# Patient Record
Sex: MALE | RURAL
[De-identification: names, ages, dates, MRNs, and addresses within clinical notes are randomized per-mention and may not be internally consistent; named-entity substitution may affect disease eponyms.]

---

## 2020-09-16 ENCOUNTER — OFFICE VISIT (OUTPATIENT)
Dept: PRIMARY CARE CLINIC | Age: 25
End: 2020-09-16

## 2020-09-16 DIAGNOSIS — Z20.828 EXPOSURE TO SARS-ASSOCIATED CORONAVIRUS: Primary | ICD-10-CM

## 2020-09-18 LAB — SARS-COV-2, NAA: NOT DETECTED

## 2024-03-06 ENCOUNTER — HOSPITAL ENCOUNTER (EMERGENCY)
Facility: HOSPITAL | Age: 29
Discharge: HOME | End: 2024-03-06
Attending: EMERGENCY MEDICINE
Payer: MEDICAID

## 2024-03-06 VITALS
HEART RATE: 97 BPM | OXYGEN SATURATION: 98 % | HEIGHT: 75 IN | BODY MASS INDEX: 39.17 KG/M2 | DIASTOLIC BLOOD PRESSURE: 102 MMHG | WEIGHT: 315 LBS | TEMPERATURE: 99.3 F | RESPIRATION RATE: 18 BRPM | SYSTOLIC BLOOD PRESSURE: 156 MMHG

## 2024-03-06 DIAGNOSIS — J02.0 STREP PHARYNGITIS: Primary | ICD-10-CM

## 2024-03-06 LAB
DEPRECATED S PYO AG THROAT QL EIA: POSITIVE
FLUAV RNA SPEC QL NAA+PROBE: NOT DETECTED
FLUBV RNA SPEC QL NAA+PROBE: NOT DETECTED
SARS-COV-2 RNA RESP QL NAA+PROBE: NOT DETECTED

## 2024-03-06 PROCEDURE — 87880 STREP A ASSAY W/OPTIC: CPT

## 2024-03-06 PROCEDURE — 6360000002 HC RX W HCPCS: Performed by: EMERGENCY MEDICINE

## 2024-03-06 PROCEDURE — 87636 SARSCOV2 & INF A&B AMP PRB: CPT

## 2024-03-06 PROCEDURE — 99283 EMERGENCY DEPT VISIT LOW MDM: CPT

## 2024-03-06 RX ORDER — DEXAMETHASONE 4 MG/1
10 TABLET ORAL ONCE
Status: COMPLETED | OUTPATIENT
Start: 2024-03-06 | End: 2024-03-06

## 2024-03-06 RX ORDER — AMOXICILLIN 500 MG/1
500 CAPSULE ORAL 2 TIMES DAILY
Qty: 20 CAPSULE | Refills: 0 | Status: SHIPPED | OUTPATIENT
Start: 2024-03-06 | End: 2024-03-16

## 2024-03-06 RX ADMIN — DEXAMETHASONE 10 MG: 4 TABLET ORAL at 07:58

## 2024-03-06 ASSESSMENT — PAIN SCALES - GENERAL: PAINLEVEL_OUTOF10: 7

## 2024-03-06 ASSESSMENT — LIFESTYLE VARIABLES: HOW OFTEN DO YOU HAVE A DRINK CONTAINING ALCOHOL: MONTHLY OR LESS

## 2024-03-06 ASSESSMENT — PAIN - FUNCTIONAL ASSESSMENT: PAIN_FUNCTIONAL_ASSESSMENT: 0-10

## 2024-03-06 ASSESSMENT — PAIN DESCRIPTION - LOCATION: LOCATION: THROAT

## 2024-03-06 NOTE — ED PROVIDER NOTES
[facility] EMERGENCY DEP  EMERGENCY DEPARTMENT ENCOUNTER       Pt Name: Raine Giron  MRN: [id number]  Birthdate 1995  Date of evaluation: 3/6/2024  Provider: Muna Palencia MD   PCP: Eliazar Zuñiga APRN - NP  Note Started: 7:55 AM 3/6/24     CHIEF COMPLAINT       Chief Complaint   Patient presents with    Sore Throat        HISTORY OF PRESENT ILLNESS: 1 or more elements      History From: Patient  Limitations: None     Raine Giron is a 28 y.o. male who presents to the emergency department for sore throat x 1.5 weeks.  Reports severe, scratchy throat.  Hurts when he swallows.  Has not noted a fever or cough.  Unsure of sick contacts.  No treatments for symptoms prior to arrival.     Nursing Notes were all reviewed and agreed with or any disagreements were addressed in the HPI.       PHYSICAL EXAM      ED Triage Vitals   Enc Vitals Group      BP 03/06/24 0729 (!) 156/102      Pulse 03/06/24 0717 97      Respirations 03/06/24 0717 18      Temp 03/06/24 0717 99.3 °F (37.4 °C)      Temp Source 03/06/24 0717 Oral      SpO2 03/06/24 0717 98 %      Weight - Scale 03/06/24 0717 (!) 142.9 kg (315 lb)      Height 03/06/24 0717 1.905 m (6' 3\")      Head Circumference --       Peak Flow --       Pain Score --       Pain Loc --       Pain Edu? --       Excl. in GC? --               Physical Exam  Constitutional:       Appearance: Normal appearance.   HENT:      Mouth/Throat:      Mouth: Mucous membranes are moist.      Pharynx: Posterior oropharyngeal erythema present. No oropharyngeal exudate.   Cardiovascular:      Rate and Rhythm: Normal rate and regular rhythm.   Pulmonary:      Effort: Pulmonary effort is normal.      Breath sounds: Normal breath sounds.   Skin:     General: Skin is warm.   Neurological:      Mental Status: He is alert and oriented to person, place, and time.            DIAGNOSTIC RESULTS   LABS:     Recent Results (from the past 24 hour(s))   Rapid Strep Screen    Collection Time: 03/06/24

## 2024-10-26 ENCOUNTER — HOSPITAL ENCOUNTER (EMERGENCY)
Facility: HOSPITAL | Age: 29
Discharge: HOME | End: 2024-10-26
Attending: EMERGENCY MEDICINE
Payer: MEDICAID

## 2024-10-26 VITALS
HEIGHT: 75 IN | HEART RATE: 86 BPM | WEIGHT: 300 LBS | DIASTOLIC BLOOD PRESSURE: 110 MMHG | BODY MASS INDEX: 37.3 KG/M2 | OXYGEN SATURATION: 99 % | SYSTOLIC BLOOD PRESSURE: 161 MMHG | TEMPERATURE: 98.7 F | RESPIRATION RATE: 16 BRPM

## 2024-10-26 DIAGNOSIS — H10.89 OTHER CONJUNCTIVITIS OF RIGHT EYE: ICD-10-CM

## 2024-10-26 DIAGNOSIS — S05.8X1A: Primary | ICD-10-CM

## 2024-10-26 PROCEDURE — 99283 EMERGENCY DEPT VISIT LOW MDM: CPT

## 2024-10-26 RX ORDER — OFLOXACIN 3 MG/ML
2 SOLUTION/ DROPS OPHTHALMIC 4 TIMES DAILY
Qty: 5 ML | Refills: 0 | Status: SHIPPED | OUTPATIENT
Start: 2024-10-26 | End: 2024-11-05

## 2024-10-26 ASSESSMENT — LIFESTYLE VARIABLES: HOW OFTEN DO YOU HAVE A DRINK CONTAINING ALCOHOL: MONTHLY OR LESS

## 2024-10-26 NOTE — ED PROVIDER NOTES
Peak Flow       Pain Score       Pain Loc       Pain Education       Exclude from Growth Chart         Physical Exam  Constitutional:       Appearance: Normal appearance.   HENT:      Nose: Nose normal.      Mouth/Throat:      Mouth: Mucous membranes are moist.   Eyes:      General: Lids are normal.         Right eye: No foreign body or discharge.         Left eye: No foreign body or discharge.      Extraocular Movements: Extraocular movements intact.      Conjunctiva/sclera:      Right eye: Right conjunctiva is injected. No exudate or hemorrhage.     Left eye: Left conjunctiva is not injected.      Comments: No corneal abrasion or ulcer noted on eye staining.  Right eye photophobia.  No consensual photophobia.  Pupils equal and reactive to light.   Neurological:      Mental Status: He is alert.          DIAGNOSTIC RESULTS   LABS:     No results found for this or any previous visit (from the past 24 hour(s)).    EKG: If performed, independent interpretation documented below in the MDM section     RADIOLOGY:  Non-plain film images such as CT, Ultrasound and MRI are read by the radiologist. Plain radiographic images are visualized and preliminarily interpreted by the ED Provider with the findings documented in the MDM section.     Interpretation per the Radiologist below, if available at the time of this note:     No orders to display        PROCEDURES   Unless otherwise noted below, none  Procedures     EMERGENCY DEPARTMENT COURSE and DIFFERENTIAL DIAGNOSIS/MDM   Vitals:    Vitals:    10/26/24 1638   BP: (!) 161/110   Pulse: 86   Resp: 16   Temp: 98.7 °F (37.1 °C)   SpO2: 99%   Weight: 136.1 kg (300 lb)   Height: 1.905 m (6' 3\")        Patient was given the following medications:  Medications - No data to display    Medical Decision Making  Risk  Prescription drug management.      28-year-old male with right eye redness.  Patient reports he accidentally got a fingernail in his eye 2 days ago.  Reports he now has  photophobia with sunlight, pain, and redness.  No crusting.  Not a contact lens wearer.  No treatments for symptoms prior to arrival.    On corneal staining, no corneal abrasion noted.  Increased uptake around conjunctiva.  Extraocular movements are intact.  Pupils are equal and reactive to light -low suspicion for acute angle glaucoma based on normal pupil.  Patient has photophobia and right eye but no consensual photophobia to suggest uveitis.  No foreign body noted.    Will recommend go through a course of ophthalmic antibiotics for possible conjunctivitis.  Will also recommend follow-up with ophthalmology.  Plan for discharge home.    FINAL IMPRESSION     1. Superficial trauma of eye, right, initial encounter    2. Other conjunctivitis of right eye          DISPOSITION/PLAN   Raine Giron's  results have been reviewed with him.  He has been counseled regarding his diagnosis, treatment, and plan.  He verbally conveys understanding and agreement of the signs, symptoms, diagnosis, treatment and prognosis and additionally agrees to follow up as discussed.  He also agrees with the care-plan and conveys that all of his questions have been answered.  I have also provided discharge instructions for him that include: educational information regarding their diagnosis and treatment, and list of reasons why they would want to return to the ED prior to their follow-up appointment, should his condition change.     CLINICAL IMPRESSION    Discharge Note: The patient is stable for discharge home. The signs, symptoms, diagnosis, and discharge instructions have been discussed, understanding conveyed, and agreed upon. The patient is to follow up as recommended or return to ER should their symptoms worsen.      PATIENT REFERRED TO:  Weiler, Harold H, MD  [address] [place] [address]   [facility] [address] [phone]    Schedule an appointment as soon as possible for a visit          DISCHARGE MEDICATIONS:     Medication List

## 2025-05-16 ENCOUNTER — APPOINTMENT (OUTPATIENT)
Facility: HOSPITAL | Age: 30
End: 2025-05-16
Payer: MEDICAID

## 2025-05-16 ENCOUNTER — HOSPITAL ENCOUNTER (EMERGENCY)
Facility: HOSPITAL | Age: 30
Discharge: HOME | End: 2025-05-16
Attending: EMERGENCY MEDICINE
Payer: MEDICAID

## 2025-05-16 VITALS
SYSTOLIC BLOOD PRESSURE: 162 MMHG | WEIGHT: 315 LBS | TEMPERATURE: 98 F | HEART RATE: 86 BPM | DIASTOLIC BLOOD PRESSURE: 116 MMHG | HEIGHT: 75 IN | RESPIRATION RATE: 17 BRPM | BODY MASS INDEX: 39.17 KG/M2 | OXYGEN SATURATION: 97 %

## 2025-05-16 DIAGNOSIS — I10 ESSENTIAL HYPERTENSION: ICD-10-CM

## 2025-05-16 DIAGNOSIS — M10.072 ACUTE IDIOPATHIC GOUT INVOLVING TOE OF LEFT FOOT: Primary | ICD-10-CM

## 2025-05-16 PROCEDURE — 99283 EMERGENCY DEPT VISIT LOW MDM: CPT

## 2025-05-16 PROCEDURE — 73630 X-RAY EXAM OF FOOT: CPT

## 2025-05-16 RX ORDER — INDOMETHACIN 50 MG/1
50 CAPSULE ORAL 2 TIMES DAILY PRN
Qty: 30 CAPSULE | Refills: 0 | Status: SHIPPED | OUTPATIENT
Start: 2025-05-16

## 2025-05-16 RX ORDER — PREDNISONE 20 MG/1
TABLET ORAL
Qty: 20 TABLET | Refills: 0 | Status: SHIPPED | OUTPATIENT
Start: 2025-05-16

## 2025-05-16 RX ORDER — AMLODIPINE BESYLATE 5 MG/1
5 TABLET ORAL DAILY
Qty: 30 TABLET | Refills: 0 | Status: SHIPPED | OUTPATIENT
Start: 2025-05-16

## 2025-05-16 ASSESSMENT — PAIN SCALES - GENERAL: PAINLEVEL_OUTOF10: 10

## 2025-05-16 ASSESSMENT — LIFESTYLE VARIABLES
HOW MANY STANDARD DRINKS CONTAINING ALCOHOL DO YOU HAVE ON A TYPICAL DAY: 1 OR 2
HOW OFTEN DO YOU HAVE A DRINK CONTAINING ALCOHOL: 2-4 TIMES A MONTH

## 2025-05-16 ASSESSMENT — PAIN - FUNCTIONAL ASSESSMENT: PAIN_FUNCTIONAL_ASSESSMENT: 0-10

## 2025-05-16 ASSESSMENT — PAIN DESCRIPTION - ORIENTATION: ORIENTATION: LEFT

## 2025-05-16 ASSESSMENT — PAIN DESCRIPTION - LOCATION: LOCATION: FOOT

## 2025-05-16 NOTE — DISCHARGE INSTRUCTIONS
Take prednisone tablets as prescribed for 10 days.  Start with 60 mg for 4 days, then 40 mg for 4 days, then 20 mg for 2 days.    Take the indomethacin twice a day as needed for pain.    Start taking the amlodipine 5 mg daily for your blood pressure.    Make an appointment with your primary care doctor for a further evaluation of your blood pressure.    Come back to the emergency department immediately for any new or worsening symptoms.

## 2025-05-16 NOTE — ED NOTES
Discharge instructions provided and reviewed with pt. Opportunity provided for discussion and pt has no further questions at this time.

## 2025-05-16 NOTE — ED PROVIDER NOTES
[facility] EMERGENCY DEPARTMENT  EMERGENCY DEPARTMENT ENCOUNTER       Pt Name: Raine Giron  MRN: [id number]  Birthdate 1995  Date of evaluation: 5/16/2025  Provider: Muna Palencia MD   PCP: Eliazar Zuñiga APRN - NP  Note Started: 9:08 AM EDT 5/16/25     CHIEF COMPLAINT       Chief Complaint   Patient presents with    Foot Swelling        HISTORY OF PRESENT ILLNESS: 1 or more elements      History From: patient, History limited by: none     Raine Giron is a 29 y.o. male presents to ED complaining of L foot pain.  Onset 2 days ago.  No trauma.       Please See MDM for Additional Details of the HPI/PMH  Nursing Notes were all reviewed and agreed with or any disagreements were addressed in the HPI.     REVIEW OF SYSTEMS        Positives and Pertinent negatives as per HPI.    PAST HISTORY     Past Medical History:  Past Medical History:   Diagnosis Date    Chicken pox 02/26/2001    Contusion of left foot 09/27/2005    Developmental delay 05/01/98    Laceration of scalp 03/09/2010    Myopia 05/01/98    Otitis media        Past Surgical History:  Past Surgical History:   Procedure Laterality Date    CIRCUMCISION         Family History:  History reviewed. No pertinent family history.    Social History:  Social History     Tobacco Use    Smoking status: Every Day     Current packs/day: 0.25     Types: Cigarettes    Smokeless tobacco: Never   Substance Use Topics    Alcohol use: Yes    Drug use: No       CURRENT MEDICATIONS      Discharge Medication List as of 5/16/2025  9:38 AM          SCREENINGS               No data recorded            PHYSICAL EXAM      ED Triage Vitals   Encounter Vitals Group      BP       Systolic BP Percentile       Diastolic BP Percentile       Pulse       Resp       Temp       Temp src       SpO2       Weight       Height       Head Circumference       Peak Flow       Pain Score       Pain Loc       Pain Education       Exclude from Growth Chart         Physical  of gout.    On exam, patient has a swollen left MTP joint that is tender to palpation.  He has a normal DP pulse.  He has no ankle tenderness.  He is able to bear and exchange weight.    High suspicion for gout.  X-ray obtained that reveals no acute fracture or degenerative arthritis at location of pain.  Will recommend course of steroids plus indomethacin for pain.    Patient is also notably hypertensive.  Reports he has been told this in the past but has not been started on any medication and does not see a primary care doctor.  Will initiate amlodipine and recommend PCP follow-up for continued BP monitoring and an annual checkup.  Will discharge home.      FINAL IMPRESSION     1. Acute idiopathic gout involving toe of left foot    2. Essential hypertension          DISPOSITION/PLAN   Raine Giron's  results have been reviewed with him.  He has been counseled regarding his diagnosis, treatment, and plan.  He verbally conveys understanding and agreement of the signs, symptoms, diagnosis, treatment and prognosis and additionally agrees to follow up as discussed.  He also agrees with the care-plan and conveys that all of his questions have been answered.  I have also provided discharge instructions for him that include: educational information regarding their diagnosis and treatment, and list of reasons why they would want to return to the ED prior to their follow-up appointment, should his condition change.     CLINICAL IMPRESSION    Discharge Note: The patient is stable for discharge home. The signs, symptoms, diagnosis, and discharge instructions have been discussed, understanding conveyed, and agreed upon. The patient is to follow up as recommended or return to ER should their symptoms worsen.      PATIENT REFERRED TO:  Eliazar Zuñiga APRN - NP  [address] [facility] [address] [phone]    Schedule an appointment as soon as possible for a visit          DISCHARGE MEDICATIONS:     Medication List

## 2025-06-17 NOTE — PROGRESS NOTES
Chief Complaint   Patient presents with    Employment Physical     [employer] physical         HPI:       is a 29 y.o. male.  PRAVIN Zuñiga is his PCP.  He has a history of gout only on PRN treatments of Indocin and Prednisone.  History of HTN.  Was prescribed Amlodipine previously, but is not taking it.     New Issues:  This patient presents today for an employment physical required by [employer].  Medications and problem list can be found below.     No Known Allergies    Current Outpatient Medications   Medication Sig Dispense Refill    amLODIPine (NORVASC) 5 MG tablet Take 1 tablet by mouth daily (Patient not taking: Reported on 6/19/2025) 30 tablet 0    predniSONE (DELTASONE) 20 MG tablet 60mg PO daily x 4 days, 40mg PO daily x 4 days, 20mg PO daily x 2 days.  Total 10 days, 22 tablets. (Patient not taking: Reported on 6/19/2025) 20 tablet 0    indomethacin (INDOCIN) 50 MG capsule Take 1 capsule by mouth 2 times daily as needed for Pain (Patient not taking: Reported on 6/19/2025) 30 capsule 0     No current facility-administered medications for this visit.       Past Medical History:   Diagnosis Date    Chicken pox 02/26/2001    Contusion of left foot 09/27/2005    Developmental delay 05/01/98    Laceration of scalp 03/09/2010    Myopia 05/01/98    Otitis media        Past Surgical History:   Procedure Laterality Date    CIRCUMCISION         Social History     Socioeconomic History    Marital status: Single     Spouse name: None    Number of children: None    Years of education: None    Highest education level: None   Tobacco Use    Smoking status: Every Day     Current packs/day: 0.25     Types: Cigarettes    Smokeless tobacco: Never   Substance and Sexual Activity    Alcohol use: Yes    Drug use: No     Social Drivers of Health     Food Insecurity: No Food Insecurity (6/19/2025)    Hunger Vital Sign     Worried About Running Out of Food in the Last Year: Never true     Ran Out of Food in the Last Year: Never true

## 2025-06-19 ENCOUNTER — OFFICE VISIT (OUTPATIENT)
Age: 30
End: 2025-06-19

## 2025-06-19 VITALS
RESPIRATION RATE: 18 BRPM | TEMPERATURE: 98.2 F | OXYGEN SATURATION: 96 % | WEIGHT: 298.6 LBS | SYSTOLIC BLOOD PRESSURE: 145 MMHG | HEART RATE: 97 BPM | BODY MASS INDEX: 37.32 KG/M2 | DIASTOLIC BLOOD PRESSURE: 105 MMHG

## 2025-06-19 DIAGNOSIS — Z02.89 ENCOUNTER FOR PHYSICAL EXAMINATION RELATED TO EMPLOYMENT: Primary | ICD-10-CM

## 2025-06-19 SDOH — ECONOMIC STABILITY: FOOD INSECURITY: WITHIN THE PAST 12 MONTHS, THE FOOD YOU BOUGHT JUST DIDN'T LAST AND YOU DIDN'T HAVE MONEY TO GET MORE.: NEVER TRUE

## 2025-06-19 SDOH — ECONOMIC STABILITY: FOOD INSECURITY: WITHIN THE PAST 12 MONTHS, YOU WORRIED THAT YOUR FOOD WOULD RUN OUT BEFORE YOU GOT MONEY TO BUY MORE.: NEVER TRUE

## 2025-06-19 ASSESSMENT — PATIENT HEALTH QUESTIONNAIRE - PHQ9
SUM OF ALL RESPONSES TO PHQ QUESTIONS 1-9: 0
1. LITTLE INTEREST OR PLEASURE IN DOING THINGS: NOT AT ALL
SUM OF ALL RESPONSES TO PHQ QUESTIONS 1-9: 0
SUM OF ALL RESPONSES TO PHQ QUESTIONS 1-9: 0
2. FEELING DOWN, DEPRESSED OR HOPELESS: NOT AT ALL
SUM OF ALL RESPONSES TO PHQ QUESTIONS 1-9: 0

## 2025-06-19 NOTE — PROGRESS NOTES
Raine Giron is a 29 y.o. male presenting for/with:    Chief Complaint   Patient presents with    Employment Physical     [employer] physical       Vitals:    06/19/25 1056   BP: (!) 142/100   BP Site: Left Upper Arm   Patient Position: Sitting   BP Cuff Size: Large Adult   Pulse: 97   Resp: 18   Temp: 98.2 °F (36.8 °C)   TempSrc: Temporal   SpO2: 96%   Weight: 135.4 kg (298 lb 9.6 oz)       Pain Scale: 0 - No pain/10  Pain Location:     \"Have you been to the ER, urgent care clinic since your last visit?  Hospitalized since your last visit?\"    YES, 5/16/25 for gout, 10/26/24, for trauma of right eye, and 3/6/24 for strep throat.    “Have you seen or consulted any other health care providers outside of  since your last visit?”    NO                 6/19/2025    10:55 AM   PHQ-9    Little interest or pleasure in doing things 0   Feeling down, depressed, or hopeless 0   PHQ-2 Score 0   PHQ-9 Total Score 0            No data to display                     6/19/2025    10:55 AM   Amb Fall Risk Assessment and TUG Test   Do you feel unsteady or are you worried about falling?  no   2 or more falls in past year? no   Fall with injury in past year? no           6/19/2025    10:00 AM   ADL ASSESSMENT   Feeding yourself No Help Needed   Getting from bed to chair No Help Needed   Getting dressed No Help Needed   Bathing or showering No Help Needed   Walk across the room (includes cane/walker) No Help Needed   Using the telphone No Help Needed   Taking your medications No Help Needed   Preparing meals No Help Needed   Managing money (expenses/bills) No Help Needed   Moderately strenuous housework (laundry) No Help Needed   Shopping for personal items (toiletries/medicines) No Help Needed   Shopping for groceries No Help Needed   Driving No Help Needed   Climbing a flight of stairs No Help Needed   Getting to places beyond walking distances No Help Needed           6/19/2025    10:00 AM   AMB Abuse Screening

## 2025-07-08 NOTE — PROGRESS NOTES
Chief Complaint   Patient presents with    Employment Physical     [employer]         HPI:       is a 29 y.o. male.  PRAVIN Zuñiga is his PCP.  He has a history of gout only on PRN treatments of Indocin and Prednisone.  History of HTN.  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     He was seen for his [employer] physical on 6/19/25.  His BP was uncontrolled.  He was sent back to his PCP. Started on Amlodipine 7/2/25.     New Issues:  This patient presents today for an employment physical required by [employer].  Medications and problem list can be found below.     No Known Allergies    Current Outpatient Medications   Medication Sig Dispense Refill    ergocalciferol (ERGOCALCIFEROL) 1.25 MG (55439 UT) capsule Take 1 capsule by mouth once a week      amLODIPine (NORVASC) 5 MG tablet Take 1 tablet by mouth daily 30 tablet 0     No current facility-administered medications for this visit.       Past Medical History:   Diagnosis Date    Chicken pox 02/26/2001    Contusion of left foot 09/27/2005    Developmental delay 05/01/98    Laceration of scalp 03/09/2010    Myopia 05/01/98    Otitis media        Past Surgical History:   Procedure Laterality Date    CIRCUMCISION         Social History     Socioeconomic History    Marital status: Single     Spouse name: None    Number of children: None    Years of education: None    Highest education level: None   Tobacco Use    Smoking status: Every Day     Current packs/day: 0.25     Types: Cigarettes    Smokeless tobacco: Never   Substance and Sexual Activity    Alcohol use: Yes    Drug use: No     Social Drivers of Health     Food Insecurity: No Food Insecurity (6/19/2025)    Hunger Vital Sign     Worried About Running Out of Food in the Last Year: Never true     Ran Out of Food in the Last Year: Never true   Transportation Needs: No Transportation Needs (6/19/2025)    PRAPARE - Transportation     Lack of Transportation (Medical): No     Lack of Transportation (Non-Medical): No

## 2025-07-17 ENCOUNTER — OFFICE VISIT (OUTPATIENT)
Age: 30
End: 2025-07-17

## 2025-07-17 VITALS
OXYGEN SATURATION: 97 % | HEART RATE: 98 BPM | TEMPERATURE: 97.4 F | BODY MASS INDEX: 37.47 KG/M2 | DIASTOLIC BLOOD PRESSURE: 86 MMHG | HEIGHT: 75 IN | SYSTOLIC BLOOD PRESSURE: 138 MMHG | RESPIRATION RATE: 18 BRPM | WEIGHT: 301.4 LBS

## 2025-07-17 DIAGNOSIS — Z02.89 ENCOUNTER FOR PHYSICAL EXAMINATION RELATED TO EMPLOYMENT: Primary | ICD-10-CM

## 2025-07-17 RX ORDER — ERGOCALCIFEROL 1.25 MG/1
50000 CAPSULE ORAL WEEKLY
COMMUNITY
Start: 2025-07-03 | End: 2025-09-25

## 2025-07-17 ASSESSMENT — PATIENT HEALTH QUESTIONNAIRE - PHQ9
1. LITTLE INTEREST OR PLEASURE IN DOING THINGS: NOT AT ALL
2. FEELING DOWN, DEPRESSED OR HOPELESS: NOT AT ALL
SUM OF ALL RESPONSES TO PHQ QUESTIONS 1-9: 0

## 2025-07-17 NOTE — PROGRESS NOTES
Raine Giron is a 29 y.o. male presenting for/with:    Chief Complaint   Patient presents with    Employment Physical     [employer]       Vitals:    07/17/25 1300   BP: 138/86   Pulse: 98   Resp: 18   Temp: 97.4 °F (36.3 °C)   TempSrc: Temporal   SpO2: 97%   Weight: (!) 136.7 kg (301 lb 6.4 oz)   Height: 1.905 m (6' 3\")       Pain Scale: 0 - No pain/10  Pain Location:     \"Have you been to the ER, urgent care clinic since your last visit?  Hospitalized since your last visit?\"    NO    “Have you seen or consulted any other health care providers outside of [facility] since your last visit?”    NO                 7/17/2025     1:36 PM   PHQ-9    Little interest or pleasure in doing things 0   Feeling down, depressed, or hopeless 0   PHQ-2 Score 0   PHQ-9 Total Score 0            No data to display                     7/17/2025     1:36 PM   Amb Fall Risk Assessment and TUG Test   Do you feel unsteady or are you worried about falling?  no   2 or more falls in past year? no   Fall with injury in past year? no           7/17/2025     1:00 PM 6/19/2025    10:00 AM   ADL ASSESSMENT   Feeding yourself No Help Needed No Help Needed   Getting from bed to chair No Help Needed No Help Needed   Getting dressed No Help Needed No Help Needed   Bathing or showering No Help Needed No Help Needed   Walk across the room (includes cane/walker) No Help Needed No Help Needed   Using the telphone No Help Needed No Help Needed   Taking your medications No Help Needed No Help Needed   Preparing meals No Help Needed No Help Needed   Managing money (expenses/bills) No Help Needed No Help Needed   Moderately strenuous housework (laundry) No Help Needed No Help Needed   Shopping for personal items (toiletries/medicines) No Help Needed No Help Needed   Shopping for groceries No Help Needed No Help Needed   Driving No Help Needed No Help Needed   Climbing a flight of stairs No Help Needed No Help Needed   Getting to places beyond